# Patient Record
(demographics unavailable — no encounter records)

---

## 2025-07-22 NOTE — PHYSICAL EXAM
[Appropriately responsive] : appropriately responsive [Alert] : alert [No Acute Distress] : no acute distress [Soft] : soft [Non-tender] : non-tender [Non-distended] : non-distended [No HSM] : No HSM [No Lesions] : no lesions [No Mass] : no mass [Oriented x3] : oriented x3 [Examination Of The Breasts] : a normal appearance [] : implants [No Masses] : no breast masses were palpable [Labia Majora] : normal [Labia Minora] : normal [Atrophy] : atrophy [Cervical Stenosis] : cervical stenosis [Normal] : normal [Uterine Adnexae] : normal

## 2025-07-22 NOTE — HISTORY OF PRESENT ILLNESS
[Patient reported mammogram was normal] : Patient reported mammogram was normal [Patient reported breast sonogram was normal] : Patient reported breast sonogram was normal [Patient reported PAP Smear was abnormal] : Patient reported PAP Smear was abnormal [Patient reported bone density results were normal] : Patient reported bone density results were normal [Patient reported colonoscopy was normal] : Patient reported colonoscopy was normal [HIV test declined] : HIV test declined [Gonorrhea test declined] : Gonorrhea test declined [Chlamydia test declined] : Chlamydia test declined [HPV test offered] : HPV test offered [postmenopausal] : postmenopausal [Options Discussed] : options discussed [Vaginal Lubrication] : vaginal lubrication [No] : Patient does not have concerns regarding sex [TextBox_4] : 72 yo P2, hx of migraines (no aura) postmenopausal female here for annual. Reports recent PAP was unsatisfactory with inflammation and is here to repeat.  Pt. was prescribed vaginal estrogen for a better result but is hesitant to take hormones at this time.  Would like to repeat PAP today  Hx. of Breast Implants, recent Mammo WNL Cologuard Normal  [Mammogramdate] : 5/2025 [TextBox_19] : Breast implant  [BreastSonogramDate] : 5/2025 [TextBox_25] : Breast Implant  [PapSmeardate] : 2025 [TextBox_31] : Unsatisfactory [BoneDensityDate] : 2024 [TextBox_43] : VINCE [TextBox_18] : Declines vaginal estrogen

## 2025-07-22 NOTE — REVIEW OF SYSTEMS
[Diarrhea] : diarrhea [Headache] : headache [Negative] : Integumentary [FreeTextEntry7] : seeing PCP and denies any blood in stool  [de-identified] : Migraines treated with Rizatriptan

## 2025-07-22 NOTE — PLAN
[FreeTextEntry1] : Recent PAP was unsatisfactory, will get results, Repeat PAP today ROR signed for last OBGYN Pt. declines vaginal estrogen at this time, not sexually active and mother had breast cancer, advised her of vaginal estrogen benefits and non-systemic effects as well as help with atrophy and unsatisfactory PAPs Discussed appropriate lubes - Uberlube  Mammo, US UTD, Cologuard UTD advised any rectal bleeding to GI f/u Renal for recent Nephrolithiasis f/u PAP results  f/u PCP for migraines and consider GI for colonoscopy - cards referral given